# Patient Record
Sex: FEMALE | Race: WHITE | Employment: FULL TIME | URBAN - METROPOLITAN AREA
[De-identification: names, ages, dates, MRNs, and addresses within clinical notes are randomized per-mention and may not be internally consistent; named-entity substitution may affect disease eponyms.]

---

## 2018-11-07 PROBLEM — Z12.4 CERVICAL CANCER SCREENING: Status: ACTIVE | Noted: 2018-11-07

## 2018-11-08 ENCOUNTER — OFFICE VISIT (OUTPATIENT)
Dept: FAMILY MEDICINE | Facility: CLINIC | Age: 38
End: 2018-11-08
Payer: COMMERCIAL

## 2018-11-08 VITALS
RESPIRATION RATE: 14 BRPM | BODY MASS INDEX: 23.77 KG/M2 | TEMPERATURE: 99.1 F | OXYGEN SATURATION: 100 % | WEIGHT: 175.5 LBS | SYSTOLIC BLOOD PRESSURE: 122 MMHG | HEIGHT: 72 IN | DIASTOLIC BLOOD PRESSURE: 80 MMHG | HEART RATE: 78 BPM

## 2018-11-08 DIAGNOSIS — Z00.01 ENCOUNTER FOR ROUTINE ADULT MEDICAL EXAM WITH ABNORMAL FINDINGS: Primary | ICD-10-CM

## 2018-11-08 DIAGNOSIS — Z12.4 CERVICAL CANCER SCREENING: ICD-10-CM

## 2018-11-08 DIAGNOSIS — R53.83 LOW ENERGY: ICD-10-CM

## 2018-11-08 DIAGNOSIS — Z13.29 SCREENING FOR THYROID DISORDER: ICD-10-CM

## 2018-11-08 LAB
BASOPHILS # BLD AUTO: 0 10E9/L (ref 0–0.2)
BASOPHILS NFR BLD AUTO: 0.2 %
DIFFERENTIAL METHOD BLD: NORMAL
EOSINOPHIL # BLD AUTO: 0.1 10E9/L (ref 0–0.7)
EOSINOPHIL NFR BLD AUTO: 0.8 %
ERYTHROCYTE [DISTWIDTH] IN BLOOD BY AUTOMATED COUNT: 12.6 % (ref 10–15)
HCT VFR BLD AUTO: 42.7 % (ref 35–47)
HGB BLD-MCNC: 14.2 G/DL (ref 11.7–15.7)
LYMPHOCYTES # BLD AUTO: 1.6 10E9/L (ref 0.8–5.3)
LYMPHOCYTES NFR BLD AUTO: 16.7 %
MCH RBC QN AUTO: 27.5 PG (ref 26.5–33)
MCHC RBC AUTO-ENTMCNC: 33.3 G/DL (ref 31.5–36.5)
MCV RBC AUTO: 83 FL (ref 78–100)
MONOCYTES # BLD AUTO: 0.6 10E9/L (ref 0–1.3)
MONOCYTES NFR BLD AUTO: 6.1 %
NEUTROPHILS # BLD AUTO: 7.2 10E9/L (ref 1.6–8.3)
NEUTROPHILS NFR BLD AUTO: 76.2 %
PLATELET # BLD AUTO: 212 10E9/L (ref 150–450)
RBC # BLD AUTO: 5.16 10E12/L (ref 3.8–5.2)
WBC # BLD AUTO: 9.4 10E9/L (ref 4–11)

## 2018-11-08 PROCEDURE — 87624 HPV HI-RISK TYP POOLED RSLT: CPT | Performed by: FAMILY MEDICINE

## 2018-11-08 PROCEDURE — 84443 ASSAY THYROID STIM HORMONE: CPT | Performed by: FAMILY MEDICINE

## 2018-11-08 PROCEDURE — 99385 PREV VISIT NEW AGE 18-39: CPT | Performed by: FAMILY MEDICINE

## 2018-11-08 PROCEDURE — 36415 COLL VENOUS BLD VENIPUNCTURE: CPT | Performed by: FAMILY MEDICINE

## 2018-11-08 PROCEDURE — G0145 SCR C/V CYTO,THINLAYER,RESCR: HCPCS | Performed by: FAMILY MEDICINE

## 2018-11-08 PROCEDURE — 85025 COMPLETE CBC W/AUTO DIFF WBC: CPT | Performed by: FAMILY MEDICINE

## 2018-11-08 PROCEDURE — 84439 ASSAY OF FREE THYROXINE: CPT | Performed by: FAMILY MEDICINE

## 2018-11-08 NOTE — PROGRESS NOTES
"   SUBJECTIVE:   CC: Susie Reyes is an 38 year old woman who presents for preventive health visit.     Physical   Annual:     Getting at least 3 servings of Calcium per day:  Yes    Bi-annual eye exam:  NO    Dental care twice a year:  Yes    Sleep apnea or symptoms of sleep apnea:  Daytime drowsiness    Diet:  Carbohydrate counting and Vegetarian/vegan    Frequency of exercise:  2-3 days/week    Duration of exercise:  15-30 minutes    Taking medications regularly:  Yes    Medication side effects:  None    Additional concerns today:  Yes    Hair falling out in \"clumps\" a few months ago.  Having \"less energy\" than usual.       Today's PHQ-2 Score:   PHQ-2 ( 1999 [employer]) 11/8/2018   Q1: Little interest or pleasure in doing things 0   Q2: Feeling down, depressed or hopeless 0   PHQ-2 Score 0   Q1: Little interest or pleasure in doing things Not at all   Q2: Feeling down, depressed or hopeless Not at all   PHQ-2 Score 0       Abuse: Current or Past(Physical, Sexual or Emotional)- No  Do you feel safe in your environment - Yes    Social History   Substance Use Topics     Smoking status: Never Smoker     Smokeless tobacco: Never Used     Alcohol use Yes      Comment: Occ     Alcohol Use 11/8/2018   If you drink alcohol do you typically have greater than 3 drinks per day OR greater than 7 drinks per week? No       Reviewed orders with patient.  Reviewed health maintenance and updated orders accordingly - Yes  Labs reviewed in EPIC        Pertinent mammograms are reviewed under the imaging tab.  History of abnormal Pap smear: NO - age 30-65 PAP every 5 years with negative HPV co-testing recommended     Reviewed and updated as needed this visit by clinical staff  Tobacco  Allergies  Meds  Problems  Med Hx  Surg Hx  Fam Hx  Soc Hx          Reviewed and updated as needed this visit by Provider  Allergies  Meds  Problems          Past Medical History:   Diagnosis Date     Abnormal Pap smear     WNL now     Rh " "incompatibility     received Rhogam on 13      History reviewed. No pertinent surgical history.  Obstetric History       T2      L2     SAB0   TAB0   Ectopic0   Multiple0   Live Births2       # Outcome Date GA Lbr El/2nd Weight Sex Delivery Anes PTL Lv   2 Term 14 38w1d 02:59 / 00:57 8 lb 8 oz (3.856 kg) M Vag-Spont Local,EPI N BANDAR      Name: JOSE ANTONIO THOMAS      Apgar1:  8                Apgar5: 8   1 Term 11 39w0d   F Vag-Vacuum EPI N BANDAR          Review of Systems  CONSTITUTIONAL: NEGATIVE for fever, chills, change in weight  INTEGUMENTARU/SKIN: NEGATIVE for worrisome rashes, moles or lesions  EYES: NEGATIVE for vision changes or irritation  ENT: NEGATIVE for ear, mouth and throat problems  RESP: NEGATIVE for significant cough or SOB  BREAST: NEGATIVE for masses, tenderness or discharge  CV: NEGATIVE for chest pain, palpitations or peripheral edema  GI: NEGATIVE for nausea, abdominal pain, heartburn, or change in bowel habits  : NEGATIVE for unusual urinary or vaginal symptoms. Periods are regular.  MUSCULOSKELETAL: NEGATIVE for significant arthralgias or myalgia  NEURO: NEGATIVE for weakness, dizziness or paresthesias  HEME/ALLERGY/IMMUNE: NEGATIVE for bleeding problems  PSYCHIATRIC: NEGATIVE for changes in mood or affect     OBJECTIVE:   /80 (Cuff Size: Adult Regular)  Pulse 78  Temp 99.1  F (37.3  C)  Resp 14  Ht 6' 0.75\" (1.848 m)  Wt 175 lb 8 oz (79.6 kg)  LMP 2018 (Approximate)  SpO2 100%  Breastfeeding? No  BMI 23.31 kg/m2  Physical Exam  GENERAL: healthy, alert and no distress  EYES: Eyes grossly normal to inspection, PERRL and conjunctivae and sclerae normal  HENT: ear canals and TM's normal, nose and mouth without ulcers or lesions  NECK: no adenopathy, no asymmetry, masses, or scars and thyroid normal to palpation  RESP: lungs clear to auscultation - no rales, rhonchi or wheezes  BREAST: normal without masses, tenderness or nipple " discharge and no palpable axillary masses or adenopathy  CV: regular rate and rhythm, normal S1 S2, no S3 or S4, no murmur, click or rub, no peripheral edema and peripheral pulses strong  ABDOMEN: soft, nontender, no hepatosplenomegaly, no masses and bowel sounds normal   (female): normal female external genitalia, normal urethral meatus, vaginal mucosa pink, moist, well rugated, and normal cervix/adnexa/uterus without masses or discharge  MS: no gross musculoskeletal defects noted, no edema  SKIN: no suspicious lesions or rashes  NEURO: Normal strength and tone, mentation intact and speech normal  PSYCH: mentation appears normal, affect normal/bright    Diagnostic Test Results:  Pap Smear, TSH/T4, CBC  ASSESSMENT/PLAN:   Susie was seen today for physical.    Diagnoses and all orders for this visit:    Encounter for routine adult medical exam with abnormal findings    Screening for thyroid disorder  -     T4, free  -     TSH    Cervical cancer screening  -     Pap imaged thin layer screen with HPV - recommended age 30 - 65 years (select HPV order below)  -     HPV High Risk Types DNA Cervical    Low energy  -     CBC with platelets and differential    Other orders  -     Cancel: TSH      Follow-up recommended for yearly preventive exams or sooner for an acute issues.    Would like to get screened for low energy and hair loss issues.  Agreeable to get thyroid checked and blood count.  May consider CMP if needed.  Denied any mood disorder.  Declined Flu shot.  COUNSELING:  Reviewed preventive health counseling, as reflected in patient instructions  Special attention given to:        Regular exercise       Healthy diet/nutrition       Vision screening       Hearing screening       HIV screeninx in teen years, 1x in adult years, and at intervals if high risk    BP Readings from Last 1 Encounters:   18 122/80     Estimated body mass index is 23.31 kg/(m^2) as calculated from the following:    Height as of  "this encounter: 6' 0.75\" (1.848 m).    Weight as of this encounter: 175 lb 8 oz (79.6 kg).           reports that she has never smoked. She has never used smokeless tobacco.      Counseling Resources:  ATP IV Guidelines  Pooled Cohorts Equation Calculator  Breast Cancer Risk Calculator  FRAX Risk Assessment  ICSI Preventive Guidelines  Dietary Guidelines for Americans, 2010  USDA's MyPlate  ASA Prophylaxis  Lung CA Screening    Meaghan Plata, [facility]  Answers for HPI/ROS submitted by the patient on 11/8/2018   PHQ-2 Score: 0    "

## 2018-11-08 NOTE — MR AVS SNAPSHOT
After Visit Summary   11/8/2018    Susie Reyes    MRN: [id number]           Patient Information     Date Of Birth          1980        Visit Information        Provider Department      11/8/2018 3:30 PM Meaghan Plata DO [facility]        Today's Diagnoses     Encounter for routine adult medical exam with abnormal findings    -  1    Screening for thyroid disorder        Cervical cancer screening        Low energy          Care Instructions      Preventive Health Recommendations  Female Ages 26 - 39  Yearly exam:   See your health care provider every year in order to    Review health changes.     Discuss preventive care.      Review your medicines if you your doctor has prescribed any.    Until age 30: Get a Pap test every three years (more often if you have had an abnormal result).    After age 30: Talk to your doctor about whether you should have a Pap test every 3 years or have a Pap test with HPV screening every 5 years.   You do not need a Pap test if your uterus was removed (hysterectomy) and you have not had cancer.  You should be tested each year for STDs (sexually transmitted diseases), if you're at risk.   Talk to your provider about how often to have your cholesterol checked.  If you are at risk for diabetes, you should have a diabetes test (fasting glucose).  Shots: Get a flu shot each year. Get a tetanus shot every 10 years.   Nutrition:     Eat at least 5 servings of fruits and vegetables each day.    Eat whole-grain bread, whole-wheat pasta and brown rice instead of white grains and rice.    Get adequate Calcium and Vitamin D.     Lifestyle    Exercise at least 150 minutes a week (30 minutes a day, 5 days of the week). This will help you control your weight and prevent disease.    Limit alcohol to one drink per day.    No smoking.     Wear sunscreen to prevent skin cancer.    See your dentist every six months for an exam and  cleaning.      Preventive Health Recommendations  Female Ages 26 - 39  Yearly exam:   See your health care provider every year in order to    Review health changes.     Discuss preventive care.      Review your medicines if you your doctor has prescribed any.    Until age 30: Get a Pap test every three years (more often if you have had an abnormal result).    After age 30: Talk to your doctor about whether you should have a Pap test every 3 years or have a Pap test with HPV screening every 5 years.   You do not need a Pap test if your uterus was removed (hysterectomy) and you have not had cancer.  You should be tested each year for STDs (sexually transmitted diseases), if you're at risk.   Talk to your provider about how often to have your cholesterol checked.  If you are at risk for diabetes, you should have a diabetes test (fasting glucose).  Shots: Get a flu shot each year. Get a tetanus shot every 10 years.   Nutrition:     Eat at least 5 servings of fruits and vegetables each day.    Eat whole-grain bread, whole-wheat pasta and brown rice instead of white grains and rice.    Get adequate Calcium and Vitamin D.     Lifestyle    Exercise at least 150 minutes a week (30 minutes a day, 5 days of the week). This will help you control your weight and prevent disease.    Limit alcohol to one drink per day.    No smoking.     Wear sunscreen to prevent skin cancer.    See your dentist every six months for an exam and cleaning.            Follow-ups after your visit        Follow-up notes from your care team     Return in about 1 year (around 11/8/2019) for Physical Exam.      Who to contact     If you have questions or need follow up information about today's clinic visit or your schedule please contact [facility] directly at [phone].  Normal or non-critical lab and imaging results will be communicated to you by MyChart, letter or phone within 4 business days after the clinic has  "received the results. If you do not hear from us within 7 days, please contact the clinic through [facility] or phone. If you have a critical or abnormal lab result, we will notify you by phone as soon as possible.  Submit refill requests through [facility] or call your pharmacy and they will forward the refill request to us. Please allow 3 business days for your refill to be completed.          Additional Information About Your Visit        [facility] Information     [facility] lets you send messages to your doctor, view your test results, renew your prescriptions, schedule appointments and more. To sign up, go to [email or web address] . Click on \"Log in\" on the left side of the screen, which will take you to the Welcome page. Then click on \"Sign up Now\" on the right side of the page.     You will be asked to enter the access code listed below, as well as some personal information. Please follow the directions to create your username and password.     Your access code is: [email or web address]  Expires: 2019  3:26 PM     Your access code will  in 90 days. If you need help or a new code, please call your [place] clinic or [phone].        Care EveryWhere ID     This is your Care EveryWhere ID. This could be used by other organizations to access your [place] medical records  [id number]        Your Vitals Were     Pulse Temperature Respirations Height Last Period Pulse Oximetry    78 99.1  F (37.3  C) 14 6' 0.75\" (1.848 m) 2018 (Approximate) 100%    Breastfeeding? BMI (Body Mass Index)                No 23.31 kg/m2           Blood Pressure from Last 3 Encounters:   18 122/80   14 110/61   14 124/69    Weight from Last 3 Encounters:   18 175 lb 8 oz (79.6 kg)   14 200 lb (90.7 kg)   14 207 lb (93.9 kg)              We Performed the Following     CBC with platelets and differential     HPV High Risk Types DNA Cervical     Pap imaged thin layer screen with HPV - recommended age 30 " - 65 years (select HPV order below)     T4, free     TSH          Today's Medication Changes          These changes are accurate as of 11/8/18  4:09 PM.  If you have any questions, ask your nurse or doctor.               Stop taking these medicines if you haven't already. Please contact your care team if you have questions.     ondansetron 4 MG ODT tab   Commonly known as:  ZOFRAN-ODT   Stopped by:  Meaghan Plata DO           scopolamine 1.5 MG patch 72 hr   Commonly known as:  TRANSDERM-SCOP   Stopped by:  Meaghan Plata DO                    Primary Care Provider Office Phone # Fax #    Brenda Hale [phone]       [facility] [address] [facility] [address]  [facility] [address]        Equal Access to Services     MIRELA HOLLOWAY AH: Hadii harry olmos hadasho Soomaali, waaxda luqadaha, qaybta kaalmada adeegyada, waxtricia mcdermott haygabe augustine . So [facility] [phone].    ATENCIÓN: Si habla español, tiene a kemp disposición servicios gratuitos de asistencia lingüística. Llame al [phone].    We comply with applicable federal civil rights laws and [place] laws. We do not discriminate on the basis of race, color, national origin, age, disability, sex, sexual orientation, or gender identity.            Thank you!     Thank you for choosing [facility]  for your care. Our goal is always to provide you with excellent care. Hearing back from our patients is one way we can continue to improve our services. Please take a few minutes to complete the written survey that you may receive in the mail after your visit with us. Thank you!             Your Updated Medication List - Protect others around you: Learn how to safely use, store and throw away your medicines at www.disposemymeds.org.          This list is accurate as of 11/8/18  4:09 PM.  Always use your most recent med list.                   Brand Name Dispense Instructions for use Diagnosis     MULTIVITAL-M PO      Take 1 tablet by mouth daily

## 2018-11-09 LAB
T4 FREE SERPL-MCNC: 0.98 NG/DL (ref 0.76–1.46)
TSH SERPL DL<=0.005 MIU/L-ACNC: 2.12 MU/L (ref 0.4–4)

## 2018-11-13 LAB
COPATH REPORT: NORMAL
PAP: NORMAL

## 2018-11-15 LAB
FINAL DIAGNOSIS: NORMAL
HPV HR 12 DNA CVX QL NAA+PROBE: NEGATIVE
HPV16 DNA SPEC QL NAA+PROBE: NEGATIVE
HPV18 DNA SPEC QL NAA+PROBE: NEGATIVE
SPECIMEN DESCRIPTION: NORMAL
SPECIMEN SOURCE CVX/VAG CYTO: NORMAL

## 2019-08-06 ENCOUNTER — APPOINTMENT (OUTPATIENT)
Age: 39
Setting detail: DERMATOLOGY
End: 2019-08-07

## 2019-08-06 DIAGNOSIS — Z41.9 ENCOUNTER FOR PROCEDURE FOR PURPOSES OTHER THAN REMEDYING HEALTH STATE, UNSPECIFIED: ICD-10-CM

## 2019-08-06 PROCEDURE — OTHER BOTOX: OTHER

## 2019-08-06 NOTE — PROCEDURE: BOTOX
Consent: Assessment :\\n“Jake Aesthetics Scales”\\n-Wrinkles= mild, Lines present at rest= mild-fine, Folds= mild, Volume Loss & Skin Laxity= mild.  \\n-Glogau Wrinkle Scale:  II-III\\n-Pt has naturally occurring heaviness in L brow compared to R.\\n\\n-Treatment areas reviewed with the patient while holding a hand-held mirror.  \\n-The patient has realistic expectations. \\n-Written consent obtained. Patient verbalized understanding. Questions answered. See written consent on file. \\n\\n-Topical anesthesia  L23%T7% was applied for 10 minutes on upper lip;  Lot #[id number], exp 9/12/19 \\n-The skin was prepped with alcohol, skin markings made and injections provided. \\n-The treatment was administered to the area(s) noted\\n-The patient tolerated the procedure well w/o incident.  \\n-Additional product (Botox) may be necessary for optimal correction following treatment and/or maintenance.\\n\\n\\n-Patient instructed to not lie down for 4-6 hours.  Patient instructed not to massage or manipulate treatment areas (avoid facial treatments or Clarisonic Brush) and limit physical activity for 24 hours.\\n-Recommended:  Voluma temples, cheeks, chin and lateral jawline for structure.\\n-Instructed to avoid: NSAIDs, Vitamin E, Fish oil, Ginko Balboa and alcohol 5-7 days before filler treatment Consent: Assessment :\\n“Jake Aesthetics Scales”\\n-Wrinkles= mild, Lines present at rest= mild-fine, Folds= mild, Volume Loss & Skin Laxity= mild.  \\n-Glogau Wrinkle Scale:  II-III\\n-Pt has naturally occurring heaviness in L brow compared to R.\\n\\n-Treatment areas reviewed with the patient while holding a hand-held mirror.  \\n-The patient has realistic expectations. \\n-Written consent obtained. Patient verbalized understanding. Questions answered. See written consent on file. \\n\\n-Topical anesthesia  L23%T7% was applied for 10 minutes on upper lip;  Lot #[id number], exp 9/12/19 \\n-The skin was prepped with alcohol, skin markings made and injections provided. \\n-The treatment was administered to the area(s) noted\\n-The patient tolerated the procedure well w/o incident.  \\n-Additional product (Botox) may be necessary for optimal correction following treatment and/or maintenance.\\n\\n\\n-Patient instructed to not lie down for 4-6 hours.  Patient instructed not to massage or manipulate treatment areas (avoid facial treatments or Clarisonic Brush) and limit physical activity for 24 hours.\\n-Recommended:  Voluma temples, cheeks, chin and lateral jawline for structure.\\n-Instructed to avoid: NSAIDs, Vitamin E, Fish oil, Ginko Balboa and alcohol 5-7 days before filler treatment

## 2019-08-06 NOTE — PROCEDURE: BOTOX
Price (Use Numbers Only, No Special Characters Or $): 680 Price (Use Numbers Only, No Special Characters Or $): 075

## 2019-09-27 ENCOUNTER — HEALTH MAINTENANCE LETTER (OUTPATIENT)
Age: 39
End: 2019-09-27

## 2019-12-11 ENCOUNTER — APPOINTMENT (OUTPATIENT)
Age: 39
Setting detail: DERMATOLOGY
End: 2019-12-12

## 2019-12-11 ENCOUNTER — APPOINTMENT (OUTPATIENT)
Age: 39
Setting detail: DERMATOLOGY
End: 2019-12-11

## 2019-12-11 DIAGNOSIS — Z41.9 ENCOUNTER FOR PROCEDURE FOR PURPOSES OTHER THAN REMEDYING HEALTH STATE, UNSPECIFIED: ICD-10-CM

## 2019-12-11 PROCEDURE — OTHER BOTOX: OTHER

## 2019-12-11 NOTE — PROCEDURE: BOTOX
Consent: Assessment :\\n“Jake Aesthetics Scales”\\n-Wrinkles= mild, Lines present at rest= mild-fine, Folds= mild, Volume Loss & Skin Laxity= mild.  \\n-Glogau Wrinkle Scale:  II-III\\n-Pt has naturally occurring heaviness in L brow compared to R.\\n\\n-Treatment areas reviewed with the patient while holding a hand-held mirror.  \\n-The patient has realistic expectations. \\n\\n-Informed patient that lower face Botox injections are considered off-label.\\n\\n-Written consent obtained. Patient verbalized understanding. Questions answered. See written consent on file. \\n\\n-Topical anesthesia  L23%T7% was applied for 10 minutes on upper lip;  Lot #[id number], exp 9/12/19 \\n-The skin was prepped with alcohol, skin markings made and injections provided. \\n-The treatment was administered to the area(s) noted.\\n-The patient tolerated the procedure well w/o incident.  \\n-Additional product (Botox) may be necessary for optimal correction following treatment and/or maintenance.\\n-Patient instructed to not lie down for 4-6 hours.  Patient instructed not to massage or manipulate treatment areas (avoid facial treatments or Clarisonic Brush) and limit physical activity for 24 hours.\\n\\n-Calipers used\\n\\n-Recommended:  Voluma temples, cheeks, chin and lateral jawline for structure.\\n-Instructed to avoid: NSAIDs, Vitamin E, Fish oil, Ginkgo Biloba  and alcohol 5-7 days before filler treatment\\n-Skin Care: Obagi hydroquinone, tretinoin Consent: Assessment :\\n“Jake Aesthetics Scales”\\n-Wrinkles= mild, Lines present at rest= mild-fine, Folds= mild, Volume Loss & Skin Laxity= mild.  \\n-Glogau Wrinkle Scale:  II-III\\n-Pt has naturally occurring heaviness in L brow compared to R.\\n\\n-Treatment areas reviewed with the patient while holding a hand-held mirror.  \\n-The patient has realistic expectations. \\n\\n-Informed patient that lower face Botox injections are considered off-label.\\n\\n-Written consent obtained. Patient verbalized understanding. Questions answered. See written consent on file. \\n\\n-Topical anesthesia  L23%T7% was applied for 10 minutes on upper lip;  Lot #[id number], exp 9/12/19 \\n-The skin was prepped with alcohol, skin markings made and injections provided. \\n-The treatment was administered to the area(s) noted.\\n-The patient tolerated the procedure well w/o incident.  \\n-Additional product (Botox) may be necessary for optimal correction following treatment and/or maintenance.\\n-Patient instructed to not lie down for 4-6 hours.  Patient instructed not to massage or manipulate treatment areas (avoid facial treatments or Clarisonic Brush) and limit physical activity for 24 hours.\\n\\n-Calipers used\\n\\n-Recommended:  Voluma temples, cheeks, chin and lateral jawline for structure.\\n-Instructed to avoid: NSAIDs, Vitamin E, Fish oil, Ginkgo Biloba  and alcohol 5-7 days before filler treatment\\n-Skin Care: Obagi hydroquinone, tretinoin

## 2020-03-15 ENCOUNTER — HEALTH MAINTENANCE LETTER (OUTPATIENT)
Age: 40
End: 2020-03-15

## 2020-05-29 ENCOUNTER — APPOINTMENT (OUTPATIENT)
Age: 40
Setting detail: DERMATOLOGY
End: 2020-05-29

## 2020-05-29 PROCEDURE — OTHER BOTOX: OTHER

## 2020-05-30 DIAGNOSIS — Z41.9 ENCOUNTER FOR PROCEDURE FOR PURPOSES OTHER THAN REMEDYING HEALTH STATE, UNSPECIFIED: ICD-10-CM

## 2020-05-30 NOTE — PROCEDURE: BOTOX
Consent: Assessment :\\n“Jake Aesthetics Scales”\\n-Wrinkles= mild, Lines present at rest= mild-fine, Folds= mild, Volume Loss & Skin Laxity= mild.  \\n-Glogau Wrinkle Scale:  II-III\\n-Pt has naturally occurring heaviness in L brow compared to R.\\n\\n-Treatment areas reviewed with the patient while holding a hand-held mirror.  \\n-The patient has realistic expectations. \\n\\n-Informed patient that lower face Botox injections are considered off-label.\\n\\n-Written consent obtained. Patient verbalized understanding. Questions answered. See written consent on file. \\n\\n-Topical anesthesia  L23%T7% was applied for 10 minutes on upper lip;  Lot #[id number], exp 9/12/19 \\n-The skin was prepped with alcohol, skin markings made and injections provided. \\n-The treatment was administered to the area(s) noted.\\n-The patient tolerated the procedure well w/o incident.  \\n-Additional product (Botox) may be necessary for optimal correction following treatment and/or maintenance.\\n-Patient instructed to not lie down for 4-6 hours.  Patient instructed not to massage or manipulate treatment areas (avoid facial treatments or Clarisonic Brush) and limit physical activity for 24 hours.\\n\\n-Calipers used\\n\\n-Recommended:  Voluma temples, cheeks, chin and lateral jawline for structure.\\n-Instructed to avoid: NSAIDs, Vitamin E, Fish oil, Ginkgo Biloba  and alcohol 5-7 days before filler treatment\\n-Skin Care: Obagi hydroquinone, tretinoin Consent: Assessment :\\n“Jake Aesthetics Scales”\\n-Wrinkles= mild, Lines present at rest= mild-fine, Folds= mild, Volume Loss & Skin Laxity= mild.  \\n-Glogau Wrinkle Scale:  II-III\\n-Pt has naturally occurring heaviness in L brow compared to R.\\n\\n-Treatment areas reviewed with the patient while holding a hand-held mirror.  \\n-The patient has realistic expectations. \\n\\n-Informed patient that lower face Botox injections are considered off-label.\\n\\n-Written consent obtained. Patient verbalized understanding. Questions answered. See written consent on file. \\n\\n-Topical anesthesia  L23%T7% was applied for 10 minutes on upper lip;  Lot #[id number], exp 9/12/19 \\n-The skin was prepped with alcohol, skin markings made and injections provided. \\n-The treatment was administered to the area(s) noted.\\n-The patient tolerated the procedure well w/o incident.  \\n-Additional product (Botox) may be necessary for optimal correction following treatment and/or maintenance.\\n-Patient instructed to not lie down for 4-6 hours.  Patient instructed not to massage or manipulate treatment areas (avoid facial treatments or Clarisonic Brush) and limit physical activity for 24 hours.\\n\\n-Calipers used\\n\\n-Recommended:  Voluma temples, cheeks, chin and lateral jawline for structure.\\n-Instructed to avoid: NSAIDs, Vitamin E, Fish oil, Ginkgo Biloba  and alcohol 5-7 days before filler treatment\\n-Skin Care: Obagi hydroquinone, tretinoin

## 2020-09-29 ENCOUNTER — APPOINTMENT (OUTPATIENT)
Dept: URBAN - METROPOLITAN AREA CLINIC 256 | Age: 40
Setting detail: DERMATOLOGY
End: 2020-09-29

## 2021-01-09 ENCOUNTER — HEALTH MAINTENANCE LETTER (OUTPATIENT)
Age: 41
End: 2021-01-09

## 2021-01-15 ENCOUNTER — APPOINTMENT (OUTPATIENT)
Dept: URBAN - METROPOLITAN AREA CLINIC 256 | Age: 41
Setting detail: DERMATOLOGY
End: 2021-01-15

## 2021-02-26 ENCOUNTER — HOSPITAL ENCOUNTER (OUTPATIENT)
Dept: MAMMOGRAPHY | Facility: CLINIC | Age: 41
Discharge: HOME | End: 2021-02-26
Attending: FAMILY MEDICINE | Admitting: FAMILY MEDICINE
Payer: COMMERCIAL

## 2021-02-26 DIAGNOSIS — Z12.31 VISIT FOR SCREENING MAMMOGRAM: ICD-10-CM

## 2021-02-26 PROCEDURE — 77063 BREAST TOMOSYNTHESIS BI: CPT

## 2021-05-08 ENCOUNTER — HEALTH MAINTENANCE LETTER (OUTPATIENT)
Age: 41
End: 2021-05-08

## 2021-05-10 ENCOUNTER — APPOINTMENT (OUTPATIENT)
Dept: URBAN - METROPOLITAN AREA CLINIC 256 | Age: 41
Setting detail: DERMATOLOGY
End: 2021-05-10

## 2021-08-12 ENCOUNTER — APPOINTMENT (OUTPATIENT)
Dept: URBAN - METROPOLITAN AREA CLINIC 256 | Age: 41
Setting detail: DERMATOLOGY
End: 2021-08-12

## 2021-10-23 ENCOUNTER — HEALTH MAINTENANCE LETTER (OUTPATIENT)
Age: 41
End: 2021-10-23

## 2021-11-17 ENCOUNTER — APPOINTMENT (OUTPATIENT)
Dept: URBAN - METROPOLITAN AREA CLINIC 256 | Age: 41
Setting detail: DERMATOLOGY
End: 2021-12-28

## 2022-01-14 ENCOUNTER — APPOINTMENT (OUTPATIENT)
Dept: URBAN - METROPOLITAN AREA CLINIC 256 | Age: 42
Setting detail: DERMATOLOGY
End: 2022-01-14

## 2022-02-02 ENCOUNTER — APPOINTMENT (OUTPATIENT)
Dept: URBAN - METROPOLITAN AREA CLINIC 256 | Age: 42
Setting detail: DERMATOLOGY
End: 2022-02-02

## 2022-04-05 ENCOUNTER — ANCILLARY PROCEDURE (OUTPATIENT)
Dept: MAMMOGRAPHY | Facility: CLINIC | Age: 42
End: 2022-04-05
Attending: OBSTETRICS & GYNECOLOGY
Payer: COMMERCIAL

## 2022-04-05 DIAGNOSIS — Z12.31 VISIT FOR SCREENING MAMMOGRAM: ICD-10-CM

## 2022-04-05 PROCEDURE — 77067 SCR MAMMO BI INCL CAD: CPT | Mod: TC | Performed by: RADIOLOGY

## 2022-04-05 PROCEDURE — 77063 BREAST TOMOSYNTHESIS BI: CPT | Mod: TC | Performed by: RADIOLOGY

## 2022-04-15 ENCOUNTER — HOSPITAL ENCOUNTER (OUTPATIENT)
Dept: MAMMOGRAPHY | Facility: CLINIC | Age: 42
Discharge: HOME | End: 2022-04-15
Attending: INTERNAL MEDICINE | Admitting: INTERNAL MEDICINE
Payer: COMMERCIAL

## 2022-04-15 DIAGNOSIS — R92.8 ABNORMAL MAMMOGRAM: ICD-10-CM

## 2022-04-15 PROCEDURE — 76642 ULTRASOUND BREAST LIMITED: CPT | Mod: RT

## 2022-05-10 ENCOUNTER — APPOINTMENT (OUTPATIENT)
Dept: URBAN - METROPOLITAN AREA CLINIC 256 | Age: 42
Setting detail: DERMATOLOGY
End: 2022-05-10

## 2022-06-04 ENCOUNTER — HEALTH MAINTENANCE LETTER (OUTPATIENT)
Age: 42
End: 2022-06-04

## 2022-08-24 ENCOUNTER — APPOINTMENT (OUTPATIENT)
Dept: URBAN - METROPOLITAN AREA CLINIC 256 | Age: 42
Setting detail: DERMATOLOGY
End: 2022-08-24

## 2022-08-24 DIAGNOSIS — Z41.9 ENCOUNTER FOR PROCEDURE FOR PURPOSES OTHER THAN REMEDYING HEALTH STATE, UNSPECIFIED: ICD-10-CM

## 2022-08-24 PROCEDURE — OTHER BOTOX (U OR CC): OTHER

## 2022-08-25 ENCOUNTER — APPOINTMENT (OUTPATIENT)
Dept: URBAN - METROPOLITAN AREA CLINIC 256 | Age: 42
Setting detail: DERMATOLOGY
End: 2022-08-25

## 2022-08-25 DIAGNOSIS — Z41.9 ENCOUNTER FOR PROCEDURE FOR PURPOSES OTHER THAN REMEDYING HEALTH STATE, UNSPECIFIED: ICD-10-CM

## 2022-08-25 PROCEDURE — OTHER BOTOX (U OR CC): OTHER

## 2022-10-09 ENCOUNTER — HEALTH MAINTENANCE LETTER (OUTPATIENT)
Age: 42
End: 2022-10-09

## 2023-03-05 NOTE — PROCEDURE: BOTOX
3 Consent: Assessment :\\n“[employer] Scales”\\n-Wrinkles= mild, Lines present at rest= mild-fine, Folds= mild, Volume Loss & Skin Laxity= mild.  \\n-Glogau Wrinkle Scale:  II-III\\n-Pt has naturally occurring heaviness in L brow compared to R.\\n\\n-Treatment areas reviewed with the patient while holding a hand-held mirror.  \\n-The patient has realistic expectations. \\n\\n-Informed patient that lower face Botox injections are considered off-label.\\n\\n-Written consent obtained. Patient verbalized understanding. Questions answered. See written consent on file. \\n\\n-Topical anesthesia  L23%T7% was applied for 10 minutes on upper lip;  Lot #[id number], exp 9/12/19 \\n-The skin was prepped with alcohol, skin markings made and injections provided. \\n-The treatment was administered to the area(s) noted.\\n-The patient tolerated the procedure well w/o incident.  \\n-Additional product (Botox) may be necessary for optimal correction following treatment and/or maintenance.\\n-Patient instructed to not lie down for 4-6 hours.  Patient instructed not to massage or manipulate treatment areas (avoid facial treatments or Clarisonic Brush) and limit physical activity for 24 hours.\\n\\n-Calipers used\\n\\n-Recommended:  Voluma temples, cheeks, chin and lateral jawline for structure.\\n-Instructed to avoid: NSAIDs, Vitamin E, Fish oil, Ginkgo Biloba  and alcohol 5-7 days before filler treatment\\n-Skin Care: Obagi hydroquinone, tretinoin Consent: Assessment :\\n“[employer] Scales”\\n-Wrinkles= mild, Lines present at rest= mild-fine, Folds= mild, Volume Loss & Skin Laxity= mild.  \\n-Glogau Wrinkle Scale:  II-III\\n-Pt has naturally occurring heaviness in L brow compared to R.\\n\\n-Treatment areas reviewed with the patient while holding a hand-held mirror.  \\n-The patient has realistic expectations. \\n\\n-Informed patient that lower face Botox injections are considered off-label.\\n\\n-Written consent obtained. Patient verbalized understanding. Questions answered. See written consent on file. \\n\\n-Topical anesthesia  L23%T7% was applied for 10 minutes on upper lip;  Lot #[id number], exp 9/12/19 \\n-The skin was prepped with alcohol, skin markings made and injections provided. \\n-The treatment was administered to the area(s) noted.\\n-The patient tolerated the procedure well w/o incident.  \\n-Additional product (Botox) may be necessary for optimal correction following treatment and/or maintenance.\\n-Patient instructed to not lie down for 4-6 hours.  Patient instructed not to massage or manipulate treatment areas (avoid facial treatments or Clarisonic Brush) and limit physical activity for 24 hours.\\n\\n-Calipers used\\n\\n-Recommended:  Voluma temples, cheeks, chin and lateral jawline for structure.\\n-Instructed to avoid: NSAIDs, Vitamin E, Fish oil, Ginkgo Biloba  and alcohol 5-7 days before filler treatment\\n-Skin Care: Obagi hydroquinone, tretinoin

## 2023-06-10 ENCOUNTER — HEALTH MAINTENANCE LETTER (OUTPATIENT)
Age: 43
End: 2023-06-10

## 2024-06-12 ENCOUNTER — ANCILLARY PROCEDURE (OUTPATIENT)
Dept: MAMMOGRAPHY | Facility: CLINIC | Age: 44
End: 2024-06-12
Attending: OBSTETRICS & GYNECOLOGY
Payer: COMMERCIAL

## 2024-06-12 DIAGNOSIS — Z12.31 VISIT FOR SCREENING MAMMOGRAM: ICD-10-CM

## 2024-06-12 PROCEDURE — 77067 SCR MAMMO BI INCL CAD: CPT | Mod: TC | Performed by: RADIOLOGY

## 2024-06-12 PROCEDURE — 77063 BREAST TOMOSYNTHESIS BI: CPT | Mod: TC | Performed by: RADIOLOGY

## 2024-08-03 ENCOUNTER — HEALTH MAINTENANCE LETTER (OUTPATIENT)
Age: 44
End: 2024-08-03

## 2025-06-13 NOTE — PROGRESS NOTES
"Susie is a 45 year old  female who presents for annual exam.     Besides routine health maintenance, she has no other health concerns today .    HPI:  The patient does not have a PCP.   New annual exam today.   Doing well with no major concerns.   Is sexually active with one partner and denies STI concerns/testing today. Using partner vasectomy for contraception  Cycles have become a bit shorter and heavier, sometimes 14-20 days in between with bleeding 5-6 days. Previously had very regular cycles. While this is a change in her cycle, feels she is managing the bleeding well and does not feel she needs any additional treatment.   No hot flashes or night sweats. No vagina itching, dryness, or dyspareunia. No bowel/bladder concerns.   Has noticed more weight changes. Started a recent medication for this that has kept things stable. No change to diet/exercise  Is not fasting today but open to doing in the future.   Remote hx of abnormal paps (before her kids). No hx of LEEP/cone or cryo.   Mammogram about 1 year ago and hx of dense breasts.     GYNECOLOGIC HISTORY:    Patient's last menstrual period was 2025 (exact date).    Regular menses? irregular  Menses every 14-20 days.  Length of menses: 5-6 days    Her current contraception method is: none.  She  reports that she has never smoked. She has never used smokeless tobacco.    Patient is sexually active.  STD testing offered?  Declined  Last PHQ-9 score on record =       2025     8:12 AM   PHQ-9 SCORE   PHQ-9 Total Score 0     Last GAD7 score on record =       2025     8:12 AM   NORBERT-7 SCORE   Total Score 7     Alcohol Score = 3    HEALTH MAINTENANCE:  Cholesterol: (No results found for: \"CHOL\"   Last Mammo: 2024, Result: Normal, Next Mammo: 2026   Pap:   Lab Results   Component Value Date    PAP NIL 2018     Colonoscopy:  N/A, Result: Not applicable, Next Colonoscopy: Due at age 46 years.  Dexa:  Due at age 55-65 years old. " "    Health maintenance updated:  yes    HISTORY:  OB History    Para Term  AB Living   2 2 2 0 0 2   SAB IAB Ectopic Multiple Live Births   0 0 0 0 2      # Outcome Date GA Lbr El/2nd Weight Sex Type Anes PTL Lv   2 Term 14 38w1d 02:59 / 00:57 3.856 kg (8 lb 8 oz) M Vag-Spont Local, EPI N BANDAR      Name: JOSE ANTONIO THOMAS      Apgar1: 8  Apgar5: 8   1 Term 11 39w0d   F Vag-Vacuum EPI N BANDAR       Patient Active Problem List   Diagnosis    Cervical cancer screening     History reviewed. No pertinent surgical history.   Social History     Tobacco Use    Smoking status: Never    Smokeless tobacco: Never   Substance Use Topics    Alcohol use: Yes     Comment: Occ      Problem (# of Occurrences) Relation (Name,Age of Onset)    Hypertension (1) Father    No Known Problems (1) Mother              Current Outpatient Medications   Medication Sig Dispense Refill    buPROPion (WELLBUTRIN XL) 300 MG 24 hr tablet       metFORMIN (GLUCOPHAGE) 500 MG tablet        No current facility-administered medications for this visit.     Allergies   Allergen Reactions    Azithromycin Diarrhea       Past medical, surgical, social and family histories were reviewed and updated in EPIC.    ROS:   12 point review of systems negative other than symptoms noted below or in the HPI.  No urinary frequency or dysuria, bladder or kidney problems    EXAM:  /74   Ht 1.753 m (5' 9\")   Wt 72.8 kg (160 lb 9.6 oz)   LMP 2025 (Exact Date)   BMI 23.72 kg/m     BMI: Body mass index is 23.72 kg/m .    PHYSICAL EXAM:  Constitutional:   Appearance: Well nourished, well developed, alert, in no acute distress  Neck:  Lymph Nodes:  No lymphadenopathy present    Thyroid:  Gland size normal, nontender, no nodules or masses present  on palpation  Chest:  Respiratory Effort:  Breathing unlabored  Cardiovascular:    Heart: Auscultation:  Regular rate, normal rhythm, no murmurs present  Breasts: Palpation of Breasts and " Axillae:  No masses present on palpation, no breast tenderness.  Gastrointestinal:   Abdominal Examination:  Abdomen nontender to palpation, tone normal without rigidity or guarding, no masses present, umbilicus without lesions   Liver and Spleen:  No hepatomegaly present, liver nontender to palpation    Hernias:  No hernias present  Lymphatic: Lymph Nodes:  No other lymphadenopathy present  Skin:  General Inspection:  No rashes present, no lesions present, no areas of  discoloration  Neurologic:    Mental Status:  Oriented X3.  Normal strength and tone, sensory exam                grossly normal, mentation intact and speech normal.    Psychiatric:   Mentation appears normal and affect normal/bright.         Pelvic Exam:  External Genitalia:     Normal appearance for age, no discharge present, no tenderness present, no inflammatory lesions present, color normal  Vagina:     Normal vaginal vault without central or paravaginal defects, no discharge present, no inflammatory lesions present, no masses present  Bladder:     Nontender to palpation  Urethra:   Urethral Body:  Urethra palpation normal, urethra structural support normal   Urethral Meatus:  No erythema or lesions present  Cervix:     Appearance healthy, no lesions present, nontender to palpation, no bleeding present  Uterus:     Uterus: firm, normal sized and nontender, anteverted in position.   Adnexa:     No adnexal tenderness present, no adnexal masses present  Perineum:     Perineum within normal limits, no evidence of trauma, no rashes or skin lesions present  Anus:     Anus within normal limits, no hemorrhoids present  Inguinal Lymph Nodes:     No lymphadenopathy present  Pubic Hair:     Normal pubic hair distribution for age  Genitalia and Groin:     No rashes present, no lesions present, no areas of discoloration, no masses present    COUNSELING:   Reviewed preventive health counseling, as reflected in patient instructions    BMI: Body mass index is  23.72 kg/m .      ASSESSMENT:  45 year old female with satisfactory annual exam.    ICD-10-CM    1. Encounter for gynecological examination without abnormal finding  Z01.419       2. Screening for cervical cancer  Z12.4 HPV and Gynecologic Cytology Panel - Recommended Age 30-65 Years      3. Cervical cancer screening  Z12.4       4. Need for Tdap vaccination  Z23 TDAP 7+ (ADACEL,BOOSTRIX)      5. Encounter for screening mammogram for malignant neoplasm of breast  Z12.31 MA Screen Bilateral w/Melecio      6. Screening for metabolic disorder  Z13.228 Hemoglobin A1c     Comprehensive metabolic panel (BMP + Alb, Alk Phos, ALT, AST, Total. Bili, TP)     Lipid panel reflex to direct LDL Fasting      7. Screening, anemia, deficiency, iron  Z13.0 CBC with platelets      8. Abnormal uterine bleeding (AUB)  N93.9 US Transvaginal Pelvic Non-OB          PLAN:  - Normal breast/pelvic exam  - Mammogram ordered  - Pap collected  - Future screening labs (CMP, CBC, A1C, and lipids ordered)  - TSH ordered for AUB. Suspect more a component of perimenopausal changes. If interested in further workup to rule out structural etiology, pelvic US ordered.   - Tdap administered  - RTC 1 year    Reva Rain MD

## 2025-06-16 ENCOUNTER — OFFICE VISIT (OUTPATIENT)
Dept: OBGYN | Facility: CLINIC | Age: 45
End: 2025-06-16
Payer: COMMERCIAL

## 2025-06-16 VITALS
HEIGHT: 69 IN | BODY MASS INDEX: 23.79 KG/M2 | WEIGHT: 160.6 LBS | SYSTOLIC BLOOD PRESSURE: 114 MMHG | DIASTOLIC BLOOD PRESSURE: 74 MMHG

## 2025-06-16 DIAGNOSIS — Z13.228 SCREENING FOR METABOLIC DISORDER: ICD-10-CM

## 2025-06-16 DIAGNOSIS — Z23 NEED FOR TDAP VACCINATION: ICD-10-CM

## 2025-06-16 DIAGNOSIS — Z12.4 SCREENING FOR CERVICAL CANCER: ICD-10-CM

## 2025-06-16 DIAGNOSIS — Z13.0 SCREENING, ANEMIA, DEFICIENCY, IRON: ICD-10-CM

## 2025-06-16 DIAGNOSIS — N93.9 ABNORMAL UTERINE BLEEDING (AUB): ICD-10-CM

## 2025-06-16 DIAGNOSIS — Z12.4 CERVICAL CANCER SCREENING: ICD-10-CM

## 2025-06-16 DIAGNOSIS — Z12.31 ENCOUNTER FOR SCREENING MAMMOGRAM FOR MALIGNANT NEOPLASM OF BREAST: ICD-10-CM

## 2025-06-16 DIAGNOSIS — Z01.419 ENCOUNTER FOR GYNECOLOGICAL EXAMINATION WITHOUT ABNORMAL FINDING: Primary | ICD-10-CM

## 2025-06-16 PROCEDURE — 99213 OFFICE O/P EST LOW 20 MIN: CPT | Mod: 25 | Performed by: OBSTETRICS & GYNECOLOGY

## 2025-06-16 PROCEDURE — 99386 PREV VISIT NEW AGE 40-64: CPT | Mod: 25 | Performed by: OBSTETRICS & GYNECOLOGY

## 2025-06-16 PROCEDURE — 3074F SYST BP LT 130 MM HG: CPT | Performed by: OBSTETRICS & GYNECOLOGY

## 2025-06-16 PROCEDURE — 3078F DIAST BP <80 MM HG: CPT | Performed by: OBSTETRICS & GYNECOLOGY

## 2025-06-16 PROCEDURE — 87624 HPV HI-RISK TYP POOLED RSLT: CPT | Performed by: OBSTETRICS & GYNECOLOGY

## 2025-06-16 PROCEDURE — 99459 PELVIC EXAMINATION: CPT | Performed by: OBSTETRICS & GYNECOLOGY

## 2025-06-16 PROCEDURE — 90471 IMMUNIZATION ADMIN: CPT | Performed by: OBSTETRICS & GYNECOLOGY

## 2025-06-16 PROCEDURE — 90715 TDAP VACCINE 7 YRS/> IM: CPT | Performed by: OBSTETRICS & GYNECOLOGY

## 2025-06-16 RX ORDER — BUPROPION HYDROCHLORIDE 300 MG/1
TABLET ORAL
COMMUNITY
Start: 2025-05-09

## 2025-06-16 ASSESSMENT — ANXIETY QUESTIONNAIRES
1. FEELING NERVOUS, ANXIOUS, OR ON EDGE: SEVERAL DAYS
3. WORRYING TOO MUCH ABOUT DIFFERENT THINGS: SEVERAL DAYS
2. NOT BEING ABLE TO STOP OR CONTROL WORRYING: SEVERAL DAYS
7. FEELING AFRAID AS IF SOMETHING AWFUL MIGHT HAPPEN: SEVERAL DAYS
5. BEING SO RESTLESS THAT IT IS HARD TO SIT STILL: SEVERAL DAYS
GAD7 TOTAL SCORE: 7
6. BECOMING EASILY ANNOYED OR IRRITABLE: SEVERAL DAYS
GAD7 TOTAL SCORE: 7

## 2025-06-16 ASSESSMENT — PATIENT HEALTH QUESTIONNAIRE - PHQ9
5. POOR APPETITE OR OVEREATING: SEVERAL DAYS
SUM OF ALL RESPONSES TO PHQ QUESTIONS 1-9: 0

## 2025-06-17 ENCOUNTER — PATIENT OUTREACH (OUTPATIENT)
Dept: CARE COORDINATION | Facility: CLINIC | Age: 45
End: 2025-06-17
Payer: COMMERCIAL

## 2025-06-17 LAB
HPV HR 12 DNA CVX QL NAA+PROBE: NEGATIVE
HPV16 DNA CVX QL NAA+PROBE: NEGATIVE
HPV18 DNA CVX QL NAA+PROBE: NEGATIVE
HUMAN PAPILLOMA VIRUS FINAL DIAGNOSIS: NORMAL

## 2025-06-18 ENCOUNTER — RESULTS FOLLOW-UP (OUTPATIENT)
Dept: OBGYN | Facility: CLINIC | Age: 45
End: 2025-06-18

## 2025-07-14 ENCOUNTER — ANCILLARY PROCEDURE (OUTPATIENT)
Dept: MAMMOGRAPHY | Facility: CLINIC | Age: 45
End: 2025-07-14
Attending: OBSTETRICS & GYNECOLOGY
Payer: COMMERCIAL

## 2025-07-14 ENCOUNTER — LAB (OUTPATIENT)
Dept: LAB | Facility: CLINIC | Age: 45
End: 2025-07-14
Payer: COMMERCIAL

## 2025-07-14 DIAGNOSIS — Z12.31 ENCOUNTER FOR SCREENING MAMMOGRAM FOR MALIGNANT NEOPLASM OF BREAST: ICD-10-CM

## 2025-07-14 DIAGNOSIS — Z13.0 SCREENING, ANEMIA, DEFICIENCY, IRON: ICD-10-CM

## 2025-07-14 DIAGNOSIS — Z13.228 SCREENING FOR METABOLIC DISORDER: ICD-10-CM

## 2025-07-14 DIAGNOSIS — Z12.31 VISIT FOR SCREENING MAMMOGRAM: ICD-10-CM

## 2025-07-14 LAB
ALBUMIN SERPL BCG-MCNC: 4.4 G/DL (ref 3.5–5.2)
ALP SERPL-CCNC: 44 U/L (ref 40–150)
ALT SERPL W P-5'-P-CCNC: 14 U/L (ref 0–50)
ANION GAP SERPL CALCULATED.3IONS-SCNC: 8 MMOL/L (ref 7–15)
AST SERPL W P-5'-P-CCNC: 20 U/L (ref 0–45)
BILIRUB SERPL-MCNC: 0.3 MG/DL
BUN SERPL-MCNC: 11.5 MG/DL (ref 6–20)
CALCIUM SERPL-MCNC: 8.9 MG/DL (ref 8.8–10.4)
CHLORIDE SERPL-SCNC: 109 MMOL/L (ref 98–107)
CHOLEST SERPL-MCNC: 191 MG/DL
CREAT SERPL-MCNC: 0.81 MG/DL (ref 0.51–0.95)
EGFRCR SERPLBLD CKD-EPI 2021: >90 ML/MIN/1.73M2
ERYTHROCYTE [DISTWIDTH] IN BLOOD BY AUTOMATED COUNT: 13 % (ref 10–15)
EST. AVERAGE GLUCOSE BLD GHB EST-MCNC: 94 MG/DL
FASTING STATUS PATIENT QL REPORTED: YES
FASTING STATUS PATIENT QL REPORTED: YES
GLUCOSE SERPL-MCNC: 94 MG/DL (ref 70–99)
HBA1C MFR BLD: 4.9 % (ref 0–5.6)
HCO3 SERPL-SCNC: 22 MMOL/L (ref 22–29)
HCT VFR BLD AUTO: 41.5 % (ref 35–47)
HDLC SERPL-MCNC: 76 MG/DL
HGB BLD-MCNC: 13.9 G/DL (ref 11.7–15.7)
LDLC SERPL CALC-MCNC: 99 MG/DL
MCH RBC QN AUTO: 27.4 PG (ref 26.5–33)
MCHC RBC AUTO-ENTMCNC: 33.5 G/DL (ref 31.5–36.5)
MCV RBC AUTO: 82 FL (ref 78–100)
NONHDLC SERPL-MCNC: 115 MG/DL
PLATELET # BLD AUTO: 201 10E3/UL (ref 150–450)
POTASSIUM SERPL-SCNC: 4.2 MMOL/L (ref 3.4–5.3)
PROT SERPL-MCNC: 6.6 G/DL (ref 6.4–8.3)
RBC # BLD AUTO: 5.08 10E6/UL (ref 3.8–5.2)
SODIUM SERPL-SCNC: 139 MMOL/L (ref 135–145)
TRIGL SERPL-MCNC: 81 MG/DL
WBC # BLD AUTO: 6.4 10E3/UL (ref 4–11)

## 2025-07-14 PROCEDURE — 77067 SCR MAMMO BI INCL CAD: CPT | Mod: TC | Performed by: RADIOLOGY

## 2025-07-14 PROCEDURE — 36415 COLL VENOUS BLD VENIPUNCTURE: CPT

## 2025-07-14 PROCEDURE — 80053 COMPREHEN METABOLIC PANEL: CPT

## 2025-07-14 PROCEDURE — 83036 HEMOGLOBIN GLYCOSYLATED A1C: CPT

## 2025-07-14 PROCEDURE — 3044F HG A1C LEVEL LT 7.0%: CPT

## 2025-07-14 PROCEDURE — 85027 COMPLETE CBC AUTOMATED: CPT

## 2025-07-14 PROCEDURE — 77063 BREAST TOMOSYNTHESIS BI: CPT | Mod: TC | Performed by: RADIOLOGY

## 2025-07-14 PROCEDURE — 80061 LIPID PANEL: CPT

## 2025-08-16 ENCOUNTER — HEALTH MAINTENANCE LETTER (OUTPATIENT)
Age: 45
End: 2025-08-16